# Patient Record
Sex: MALE | Race: BLACK OR AFRICAN AMERICAN | NOT HISPANIC OR LATINO | Employment: FULL TIME | URBAN - METROPOLITAN AREA
[De-identification: names, ages, dates, MRNs, and addresses within clinical notes are randomized per-mention and may not be internally consistent; named-entity substitution may affect disease eponyms.]

---

## 2017-03-24 ENCOUNTER — HOSPITAL ENCOUNTER (EMERGENCY)
Facility: MEDICAL CENTER | Age: 52
End: 2017-03-25
Attending: EMERGENCY MEDICINE

## 2017-03-24 DIAGNOSIS — S63.501A RIGHT WRIST SPRAIN, INITIAL ENCOUNTER: ICD-10-CM

## 2017-03-24 DIAGNOSIS — S63.301A: Primary | ICD-10-CM

## 2017-03-24 PROCEDURE — 99284 EMERGENCY DEPT VISIT MOD MDM: CPT

## 2017-03-24 RX ORDER — IBUPROFEN 600 MG/1
600 TABLET ORAL ONCE
Status: COMPLETED | OUTPATIENT
Start: 2017-03-25 | End: 2017-03-25

## 2017-03-24 RX ORDER — OXYCODONE HYDROCHLORIDE AND ACETAMINOPHEN 5; 325 MG/1; MG/1
1 TABLET ORAL ONCE
Status: COMPLETED | OUTPATIENT
Start: 2017-03-25 | End: 2017-03-25

## 2017-03-24 RX ORDER — MAGNESIUM GLUCONATE 27 MG(500)
250 TABLET ORAL DAILY
COMMUNITY

## 2017-03-24 RX ORDER — ASCORBIC ACID 500 MG
1000 TABLET ORAL DAILY
COMMUNITY

## 2017-03-24 ASSESSMENT — PAIN SCALES - GENERAL: PAINLEVEL_OUTOF10: 8

## 2017-03-24 NOTE — ED AVS SNAPSHOT
Home Care Instructions                                                                                                                Khoa Small   MRN: [id number]    Department:  [facility], Emergency Dept   Date of Visit:  3/24/2017            [facility], Emergency Dept    [address]    [place] [address]    Phone:  [phone]      You were seen by     Carole Medina D.O.      Your Diagnosis Was     Right wrist sprain, initial encounter     S63.501A       These are the medications you received during your hospitalization from 03/24/2017 2321 to 03/25/2017 0043     Date/Time Order Dose Route Action    03/25/2017 0003 ibuprofen (MOTRIN) tablet 600 mg 600 mg Oral Given    03/25/2017 0003 oxycodone-acetaminophen (PERCOCET) 5-325 MG per tablet 1 Tab 1 Tab Oral Given      Follow-up Information     1. Follow up with Arpit Montiel M.D. In 3 days.    Specialty:  Orthopaedics    Contact information    [address]  [address]  [place] [address]  [phone]        Medication Information     Review all of your home medications and newly ordered medications with your primary doctor and/or pharmacist as soon as possible. Follow medication instructions as directed by your doctor and/or pharmacist.     Please keep your complete medication list with you and share with your physician. Update the information when medications are discontinued, doses are changed, or new medications (including over-the-counter products) are added; and carry medication information at all times in the event of emergency situations.               Medication List      START taking these medications        Instructions    Morning Afternoon Evening Bedtime    ibuprofen 600 MG Tabs   Last time this was given:  600 mg on 3/25/2017 12:03 AM   Commonly known as:  MOTRIN        Take 1 Tab by mouth every 6 hours as needed.   Dose:  600 mg                        oxycodone-acetaminophen 5-325 MG Tabs    Last time this was given:  1 Tab on 3/25/2017 12:03 AM   Commonly known as:  PERCOCET        Take 1-2 Tabs by mouth every four hours as needed.   Dose:  1-2 Tab                          ASK your doctor about these medications        Instructions    Morning Afternoon Evening Bedtime    ascorbic acid 500 MG Tabs   Commonly known as:  ascorbic acid        Take 1,000 mg by mouth every day.   Dose:  1000 mg                        CREATINE PO        Take 250 mg by mouth 3 times a day.   Dose:  250 mg                        magnesium gluconate 500 MG tablet   Commonly known as:  MAG-G        Take 250 mg by mouth every day.   Dose:  250 mg                             Where to Get Your Medications      You can get these medications from any pharmacy     Bring a paper prescription for each of these medications    - ibuprofen 600 MG Tabs  - oxycodone-acetaminophen 5-325 MG Tabs            Procedures and tests performed during your visit     DX-WRIST-COMPLETE 3+ RIGHT    SLING    SPLINT APPLICATION        Discharge Instructions       Follow-up with orthopedics, Dr. Montiel, early next week for reevaluation and definitive treatment. Call the office Monday morning, references emergency department visit and make an appointment for follow-up.    Keep splint clean, dry and intact. Sling for comfort. Nonweightbearing right upper extremity until seen for reevaluation.  Motrin as needed for swelling or discomfort. Percocet as needed for breakthrough pain.  Rest, ice, elevation as needed for swelling or discomfort.    Return to the emergency department for persistent or worsening pain, swelling, paresthesias, discoloration or new concerns.    Wrist Sprain  A wrist sprain is a stretch or tear in the strong, fibrous tissues (ligaments) that connect your wrist bones. The ligaments of your wrist may be easily sprained. There are three types of wrist sprains.  · Grade 1. The ligament is not stretched or torn, but the sprain causes  "pain.  · Grade 2. The ligament is stretched or partially torn. You may be able to move your wrist, but not very much.  · Grade 3. The ligament or muscle completely tears. You may find it difficult or extremely painful to move your wrist even a little.  CAUSES  Often, wrist sprains are a result of a fall or an injury. The force of the impact causes the fibers of your ligament to stretch too much or tear. Common causes of wrist sprains include:  · Overextending your wrist while catching a ball with your hands.  · Repetitive or strenuous extension or bending of your wrist.  · Landing on your hand during a fall.  RISK FACTORS  · Having previous wrist injuries.  · Playing contact sports, such as boxing or wrestling.  · Participating in activities in which falling is common.  · Having poor wrist strength and flexibility.  SIGNS AND SYMPTOMS  · Wrist pain.  · Wrist tenderness.  · Inflammation or bruising of the wrist area.  · Hearing a \"pop\" or feeling a tear at the time of the injury.  · Decreased wrist movement due to pain, stiffness, or weakness.  DIAGNOSIS  Your health care provider will examine your wrist. In some cases, an X-ray will be taken to make sure you did not break any bones. If your health care provider thinks that you tore a ligament, he or she may order an MRI of your wrist.  TREATMENT  Treatment involves resting and icing your wrist. You may also need to take pain medicines to help lessen pain and inflammation. Your health care provider may recommend keeping your wrist still (immobilized) with a splint to help your sprain heal. When the splint is no longer necessary, you may need to perform strengthening and stretching exercises. These exercises help you to regain strength and full range of motion in your wrist. Surgery is not usually needed for wrist sprains unless the ligament completely tears.  HOME CARE INSTRUCTIONS  · Rest your wrist. Do not do things that cause pain.  · Wear your wrist splint as " directed by your health care provider.  · Take medicines only as directed by your health care provider.  · To ease pain and swelling, apply ice to the injured area.  ¨ Put ice in a plastic bag.  ¨ Place a towel between your skin and the bag.  ¨ Leave the ice on for 20 minutes, 2-3 times a day.  SEEK MEDICAL CARE IF:  · Your pain, discomfort, or swelling gets worse even with treatment.  · You feel sudden numbness in your hand.     This information is not intended to replace advice given to you by your health care provider. Make sure you discuss any questions you have with your health care provider.     Document Released: 08/21/2015 Document Reviewed: 08/21/2015  [employer] Interactive Patient Education ©2016 [employer]    Ligament Sprain  A ligament sprain is when the bands of tissue that hold bones together (ligament) are stretched.  HOME CARE   · Rest the injured area.  · Start using the joint when told to by your doctor.  · Keep the injured area raised (elevated) above the level of the heart. This may lessen puffiness (swelling).  · Put ice on the injured area.  · Put ice in a plastic bag.  · Place a towel between your skin and the bag.  · Leave the ice on for 15-20 minutes, 3-4 times a day.  · Wear a splint, cast, or an elastic bandage as told by your doctor.  · Only take medicine as told by your doctor.  · Use crutches as told by your doctor. Do not put weight on the injured joint until told to by your doctor.  GET HELP RIGHT AWAY IF:   · You have more bruising, puffiness, or pain.  · The leg was injured and the toes are cold, tingling, numb, or blue.  · The arm was injured and the fingers are cold, tingling, numb, or blue.  · The pain is not helped with medicine.  · The pain gets worse.  MAKE SURE YOU:   · Understand these instructions.  · Will watch this condition.  · Will get help right away if you are not doing well or get worse.  Document Released: 06/05/2009 Document Revised: 03/11/2013 Document Reviewed:  06/05/2009  [employer]® Patient Information ©2014 "[employer].            Patient Information     Patient Information    Following emergency treatment: all patient requiring follow-up care must return either to a private physician or a clinic if your condition worsens before you are able to obtain further medical attention, please return to the emergency room.     Billing Information    At [facility], we work to make the billing process streamlined for our patients.  Our Representatives are here to answer any questions you may have regarding your hospital bill.  If you have insurance coverage and have supplied your insurance information to us, we will submit a claim to your insurer on your behalf.  Should you have any questions regarding your bill, we can be reached online or by phone as follows:  Online: You are able pay your bills online or live chat with our representatives about any billing questions you may have. We are here to help Monday - Friday from 8:00am to 7:30pm and 9:00am - 12:00pm on Saturdays.  Please visit [email or web address]  for more information.   Phone:  [phone] or [phone]    Please note that your emergency physician, surgeon, pathologist, radiologist, anesthesiologist, and other specialists are not employed by [facility] and will therefore bill separately for their services.  Please contact them directly for any questions concerning their bills at the numbers below:     Emergency Physician Services:  [phone]  [facility]:  [phone]  [facility]:  [phone]  [facility]:  [phone]    1. Your final bill may vary from the amount quoted upon discharge if all procedures are not complete at that time, or if your doctor has additional procedures of which we are not aware. You will receive an additional bill if you return to the Emergency Department at [facility] for suture removal  regardless of the facility of which the sutures were placed.     2. Please arrange for settlement of this account at the emergency registration.    3. All self-pay accounts are due in full at the time of treatment.  If you are unable to meet this obligation then payment is expected within 4-5 days.     4. If you have had radiology studies (CT, X-ray, Ultrasound, MRI), you have received a preliminary result during your emergency department visit. Please contact the radiology department [phone] to receive a copy of your final result. Please discuss the Final result with your primary physician or with the follow up physician provided.     Crisis Hotline:  [place] Crisis Hotline:  4-240-WDQVTYK or 1-640.448.8566  [place] Crisis Hotline:    1-140.406.8124 or 992-903-1631         ED Discharge Follow Up Questions    1. In order to provide you with very good care, we would like to follow up with a phone call in the next few days.  May we have your permission to contact you?     YES /  NO    2. What is the best phone number to call you? (       )_____-__________    3. What is the best time to call you?      Morning  /  Afternoon  /  Evening                   Patient Signature:  ____________________________________________________________    Date:  ____________________________________________________________

## 2017-03-24 NOTE — ED AVS SNAPSHOT
[employer] Access Code: [email or web address]  Expires: 4/24/2017 12:43 AM    Your email address is not on file at [facility].  Email Addresses are required for you to sign up for [employer], please contact [phone] to verify your personal information and to provide your email address prior to attempting to register for [employer].    Khoa Small  [address] [place]  [place] [address]    [employer]  A secure, online tool to manage your health information     [facility]’s [employer]® is a secure, online tool that connects you to your personalized health information from the privacy of your home -- day or night - making it very easy for you to manage your healthcare. Once the activation process is completed, you can even access your medical information using the [employer] je, which is available for free in the Apple Je store or Google Play store.     To learn more about [employer], visit [email or web address]    There are two levels of access available (as shown below):   My Chart Features  [facility] Primary Care Doctor [facility]  Specialists [facility]  Urgent  Care Non-[facility] Primary Care Doctor   Email your healthcare team securely and privately 24/7 X X X    Manage appointments: schedule your next appointment; view details of past/upcoming appointments X      Request prescription refills. X      View recent personal medical records, including lab and immunizations X X X X   View health record, including health history, allergies, medications X X X X   Read reports about your outpatient visits, procedures, consult and ER notes X X X X   See your discharge summary, which is a recap of your hospital and/or ER visit that includes your diagnosis, lab results, and care plan X X  X     How to register for [employer]:  Once your e-mail address has been verified, follow the following steps to sign up for [employer].     1. Go to  [email or web address]  2. Click on the Sign Up Now box, which takes you to the New Member Sign Up page. You  will need to provide the following information:  a. Enter your [employer] Access Code exactly as it appears at the top of this page. (You will not need to use this code after you’ve completed the sign-up process. If you do not sign up before the expiration date, you must request a new code.)   b. Enter your date of birth.   c. Enter your home email address.   d. Click Submit, and follow the next screen’s instructions.  3. Create a [employer] ID. This will be your [employer] login ID and cannot be changed, so think of one that is secure and easy to remember.  4. Create a [employer] password. You can change your password at any time.  5. Enter your Password Reset Question and Answer. This can be used at a later time if you forget your password.   6. Enter your e-mail address. This allows you to receive e-mail notifications when new information is available in [employer].  7. Click Sign Up. You can now view your health information.    For assistance activating your [employer] account, call [phone]

## 2017-03-24 NOTE — ED AVS SNAPSHOT
3/25/2017          Khoa Small  [address]  [place] [address]    Dear Khoa:    [facility] wants to ensure your discharge home is safe and you or your loved ones have had all your questions answered regarding your care after you leave the hospital.    You may receive a telephone call within two days of your discharge.  This call is to make certain you understand your discharge instructions as well as ensure we provided you with the best care possible during your stay with us.     The call will only last approximately 3-5 minutes and will be done by a nurse.    Once again, we want to ensure your discharge home is safe and that you have a clear understanding of any next steps in your care.  If you have any questions or concerns, please do not hesitate to contact us, we are here for you.  Thank you for choosing [facility] for your healthcare needs.    Sincerely,    Silas Smith    [facility]

## 2017-03-25 ENCOUNTER — APPOINTMENT (OUTPATIENT)
Dept: RADIOLOGY | Facility: MEDICAL CENTER | Age: 52
End: 2017-03-25
Attending: EMERGENCY MEDICINE

## 2017-03-25 VITALS
DIASTOLIC BLOOD PRESSURE: 79 MMHG | BODY MASS INDEX: 28.05 KG/M2 | RESPIRATION RATE: 14 BRPM | TEMPERATURE: 99 F | HEIGHT: 73 IN | WEIGHT: 211.64 LBS | SYSTOLIC BLOOD PRESSURE: 138 MMHG | HEART RATE: 91 BPM

## 2017-03-25 PROCEDURE — A9270 NON-COVERED ITEM OR SERVICE: HCPCS | Performed by: EMERGENCY MEDICINE

## 2017-03-25 PROCEDURE — 73110 X-RAY EXAM OF WRIST: CPT | Mod: RT

## 2017-03-25 PROCEDURE — 29125 APPL SHORT ARM SPLINT STATIC: CPT

## 2017-03-25 PROCEDURE — 700102 HCHG RX REV CODE 250 W/ 637 OVERRIDE(OP): Performed by: EMERGENCY MEDICINE

## 2017-03-25 PROCEDURE — 302874 HCHG BANDAGE ACE 2 OR 3""

## 2017-03-25 PROCEDURE — 302875 HCHG BANDAGE ACE 4 OR 6""

## 2017-03-25 RX ORDER — IBUPROFEN 600 MG/1
600 TABLET ORAL EVERY 6 HOURS PRN
Qty: 20 TAB | Refills: 0 | Status: SHIPPED | OUTPATIENT
Start: 2017-03-25

## 2017-03-25 RX ORDER — OXYCODONE HYDROCHLORIDE AND ACETAMINOPHEN 5; 325 MG/1; MG/1
1-2 TABLET ORAL EVERY 4 HOURS PRN
Qty: 20 TAB | Refills: 0 | Status: SHIPPED | OUTPATIENT
Start: 2017-03-25

## 2017-03-25 RX ADMIN — IBUPROFEN 600 MG: 600 TABLET, FILM COATED ORAL at 00:03

## 2017-03-25 RX ADMIN — OXYCODONE HYDROCHLORIDE AND ACETAMINOPHEN 1 TABLET: 5; 325 TABLET ORAL at 00:03

## 2017-03-25 ASSESSMENT — PAIN SCALES - GENERAL: PAINLEVEL_OUTOF10: 5

## 2017-03-25 NOTE — ED PROVIDER NOTES
"ED Provider Note    CHIEF COMPLAINT  Chief Complaint   Patient presents with   • Wrist Pain      right wrist snapped lifting weights       HPI  Khoa Small is a 51 y.o. male who presents to the emergency department with right wrist pain and swelling. Patient states he was lifting weights, he did not have a , when the weight got too heavy a drop from his hand, hyperextending his wrist. Patient has had pain and progressive swelling since that time a few hours ago. No paresthesias. Denies other trauma. No medications at home for discomfort.    REVIEW OF SYSTEMS  See HPI for further details. All other systems are negative.     PAST MEDICAL HISTORY    denies    SOCIAL HISTORY  Social History     Social History Main Topics   • Smoking status: Never Smoker    • Smokeless tobacco: Not on file   • Alcohol Use: No   • Drug Use: No   • Sexual Activity: Not on file       SURGICAL HISTORY  patient denies any surgical history    CURRENT MEDICATIONS  Home Medications     Reviewed by Susana Harrell R.N. (Registered Nurse) on 03/24/17 at 2358  Med List Status: Complete    Medication Last Dose Status    ascorbic acid (ASCORBIC ACID) 500 MG Tab 3/24/2017 Active    CREATINE PO 3/24/2017 Active    magnesium gluconate (MAG-G) 500 MG tablet 3/24/2017 Active                ALLERGIES  No Known Allergies    PHYSICAL EXAM  VITAL SIGNS: Pulse 91  Temp(Src) 37.2 °C (99 °F)  Resp 14  Ht 1.854 m (6' 1\")  Wt 96 kg (211 lb 10.3 oz)  BMI 27.93 kg/m2  Pulse ox interpretation: I interpret this pulse ox as normal.  Constitutional: Alert. Mild distress secondary to discomfort.  HENT: Normocephalic, atraumatic. Bilateral external ears normal, Nose normal. Moist mucous membranes.    Eyes: Pupils are equal and reactive, Conjunctiva normal.   Neck: Normal range of motion, Supple.  Cardiovascular: Normal peripheral perfusion.  Thorax & Lungs: Nonlabored respirations.  Skin: Warm, Dry.  Musculoskeletal: Swelling and deformity at right " wrist. Diffuse tenderness to palpation. No skin wound. 2+ radial pulse, less than 2 2nd capillary refill. Sensation intact to light touch. Finger flexion and extension, thumbs up, opposition intact with mild discomfort.  Neurologic: Alert , no gross focal deficit noted.  Psychiatric: Affect normal, Judgment normal, Mood normal.       DIAGNOSTIC STUDIES / PROCEDURES    RADIOLOGY  DX-WRIST-COMPLETE 3+ RIGHT   Final Result         1.  Triangular shaped radiodensity in the ulnar side soft tissues of the wrist. Appearance most compatible with soft tissue foreign body. Differential could include small ligamentous avulsion fracture fragment.   2.  Otherwise no acute traumatic bony injury identified.          COURSE & MEDICAL DECISION MAKING  ED evaluation is most consistent with acute right wrist sprain, however complicated by evidence for a ligamentous avulsion fracture. CMS is intact distally. Formarm compartments are soft. Pain is controlled with Motrin and Percocet. Patient has been placed in a posterior short arm splint and sling, to remain nonweightbearing until follow-up.    Patient is stable for discharge at this time, anticipatory guidance provided, Motrin and Percocet for discomfort close follow-up is encouraged with orthopedics, Dr. Garvin, next week, and strict ED return instructions have been detailed. Patient is agreeable to the disposition and plan.    Patient's blood pressure was elevated in the emergency department, and has been referred to primary care for close monitoring.     reviewed, no pattern for concern.    FINAL IMPRESSION  (S63.501A) Rupture ligament of wrist, right, initial encounter  (primary encounter diagnosis)  (S63.501A) Right wrist sprain, initial encounter      Electronically signed by: Carole Medina, 3/24/2017 11:53 PM      This dictation was created using voice recognition software. The accuracy of the dictation is limited to the abilities of the software. I expect there may be  some errors of grammar and possibly content. The nursing notes were reviewed and certain aspects of this information were incorporated into this note.

## 2017-03-25 NOTE — ED NOTES
Pt Refused Sling application after splint, Pt states they have the same sling at home that they feel comfortable using.

## 2017-03-25 NOTE — DISCHARGE INSTRUCTIONS
"Follow-up with orthopedics, Dr. Montiel, early next week for reevaluation and definitive treatment. Call the office Monday morning, references emergency department visit and make an appointment for follow-up.    Keep splint clean, dry and intact. Sling for comfort. Nonweightbearing right upper extremity until seen for reevaluation.  Motrin as needed for swelling or discomfort. Percocet as needed for breakthrough pain.  Rest, ice, elevation as needed for swelling or discomfort.    Return to the emergency department for persistent or worsening pain, swelling, paresthesias, discoloration or new concerns.    Wrist Sprain  A wrist sprain is a stretch or tear in the strong, fibrous tissues (ligaments) that connect your wrist bones. The ligaments of your wrist may be easily sprained. There are three types of wrist sprains.  · Grade 1. The ligament is not stretched or torn, but the sprain causes pain.  · Grade 2. The ligament is stretched or partially torn. You may be able to move your wrist, but not very much.  · Grade 3. The ligament or muscle completely tears. You may find it difficult or extremely painful to move your wrist even a little.  CAUSES  Often, wrist sprains are a result of a fall or an injury. The force of the impact causes the fibers of your ligament to stretch too much or tear. Common causes of wrist sprains include:  · Overextending your wrist while catching a ball with your hands.  · Repetitive or strenuous extension or bending of your wrist.  · Landing on your hand during a fall.  RISK FACTORS  · Having previous wrist injuries.  · Playing contact sports, such as boxing or wrestling.  · Participating in activities in which falling is common.  · Having poor wrist strength and flexibility.  SIGNS AND SYMPTOMS  · Wrist pain.  · Wrist tenderness.  · Inflammation or bruising of the wrist area.  · Hearing a \"pop\" or feeling a tear at the time of the injury.  · Decreased wrist movement due to pain, stiffness, or " weakness.  DIAGNOSIS  Your health care provider will examine your wrist. In some cases, an X-ray will be taken to make sure you did not break any bones. If your health care provider thinks that you tore a ligament, he or she may order an MRI of your wrist.  TREATMENT  Treatment involves resting and icing your wrist. You may also need to take pain medicines to help lessen pain and inflammation. Your health care provider may recommend keeping your wrist still (immobilized) with a splint to help your sprain heal. When the splint is no longer necessary, you may need to perform strengthening and stretching exercises. These exercises help you to regain strength and full range of motion in your wrist. Surgery is not usually needed for wrist sprains unless the ligament completely tears.  HOME CARE INSTRUCTIONS  · Rest your wrist. Do not do things that cause pain.  · Wear your wrist splint as directed by your health care provider.  · Take medicines only as directed by your health care provider.  · To ease pain and swelling, apply ice to the injured area.  ¨ Put ice in a plastic bag.  ¨ Place a towel between your skin and the bag.  ¨ Leave the ice on for 20 minutes, 2-3 times a day.  SEEK MEDICAL CARE IF:  · Your pain, discomfort, or swelling gets worse even with treatment.  · You feel sudden numbness in your hand.     This information is not intended to replace advice given to you by your health care provider. Make sure you discuss any questions you have with your health care provider.     Document Released: 08/21/2015 Document Reviewed: 08/21/2015  [employer] Patient Education ©2016 [employer]    Ligament Sprain  A ligament sprain is when the bands of tissue that hold bones together (ligament) are stretched.  HOME CARE   · Rest the injured area.  · Start using the joint when told to by your doctor.  · Keep the injured area raised (elevated) above the level of the heart. This may lessen puffiness  (swelling).  · Put ice on the injured area.  · Put ice in a plastic bag.  · Place a towel between your skin and the bag.  · Leave the ice on for 15-20 minutes, 3-4 times a day.  · Wear a splint, cast, or an elastic bandage as told by your doctor.  · Only take medicine as told by your doctor.  · Use crutches as told by your doctor. Do not put weight on the injured joint until told to by your doctor.  GET HELP RIGHT AWAY IF:   · You have more bruising, puffiness, or pain.  · The leg was injured and the toes are cold, tingling, numb, or blue.  · The arm was injured and the fingers are cold, tingling, numb, or blue.  · The pain is not helped with medicine.  · The pain gets worse.  MAKE SURE YOU:   · Understand these instructions.  · Will watch this condition.  · Will get help right away if you are not doing well or get worse.  Document Released: 06/05/2009 Document Revised: 03/11/2013 Document Reviewed: 06/05/2009  [employer]® Patient Information ©2014 [employer].

## 2017-03-25 NOTE — ED NOTES
"Pt here with c/o    Chief Complaint   Patient presents with   • Wrist Pain      right wrist snapped lifting weights       Pulse 91  Temp(Src) 37.2 °C (99 °F)  Resp 14  Ht 1.854 m (6' 1\")  Wt 96 kg (211 lb 10.3 oz)  BMI 27.93 kg/m2    "